# Patient Record
Sex: MALE | Race: WHITE | NOT HISPANIC OR LATINO | URBAN - METROPOLITAN AREA
[De-identification: names, ages, dates, MRNs, and addresses within clinical notes are randomized per-mention and may not be internally consistent; named-entity substitution may affect disease eponyms.]

---

## 2017-01-06 NOTE — PATIENT DISCUSSION
"""Annual Type 2 Diabetic eye exam with dilation. No diabetic retinopathy found. Recommend annual dilated examinations. Patient instructed to call office immediately if sudden changes in vision occur. Emphasized importance of good blood glucose control. Summary of care provided to the physician managing the ongoing diabetes care. """

## 2018-06-08 ENCOUNTER — IMPORTED ENCOUNTER (OUTPATIENT)
Dept: URBAN - METROPOLITAN AREA CLINIC 50 | Facility: CLINIC | Age: 73
End: 2018-06-08

## 2018-10-05 NOTE — PATIENT DISCUSSION
"""Annual Type 2 Diabetic eye exam with dilation. 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 Macular edema is not present in the left eye. Recommend annual dilated examinations. Patient instructed to call office immediately if sudden changes in vision occur. Emphasized importance of good blood glucose control. Summary of care provided to the physician managing the ongoing diabetes care. """

## 2019-06-13 ENCOUNTER — IMPORTED ENCOUNTER (OUTPATIENT)
Dept: URBAN - METROPOLITAN AREA CLINIC 50 | Facility: CLINIC | Age: 74
End: 2019-06-13

## 2020-01-03 NOTE — PATIENT DISCUSSION
"""Annual Type 2 Diabetic eye exam with dilation. 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 Macular edema is not present in the right eye. Recommend annual dilated examinations. Patient instructed to call office immediately if sudden changes in vision occur. Emphasized importance of good blood glucose control. Summary of care provided to the physician managing the ongoing diabetes care. """ No

## 2020-09-28 ENCOUNTER — IMPORTED ENCOUNTER (OUTPATIENT)
Dept: URBAN - METROPOLITAN AREA CLINIC 50 | Facility: CLINIC | Age: 75
End: 2020-09-28

## 2021-04-17 ASSESSMENT — VISUAL ACUITY
OD_CC: J2@ 22 IN
OD_OTHER: 20/30. 20/40.
OD_CC: J1+@ 13 IN
OD_BAT: 20/30
OS_BAT: 20/70
OD_CC: 20/40
OD_CC: J1+@ 17 IN
OD_CC: 20/40+
OD_PH: 20/40
OD_OTHER: 20/80. >20/400.
OD_BAT: 20/80
OD_BAT: 20/80-
OD_PH: 20/25
OS_CC: 20/40+2
OD_CC: 20/60-1
OS_OTHER: 20/100. >20/400.
OS_CC: 20/25
OD_OTHER: 20/80-. 20/100.
OS_PH: 20/30
OS_CC: J1+@ 17 IN
OS_CC: 20/20-1
OS_CC: J2@ 22 IN
OS_OTHER: 20/70. 20/80.
OS_OTHER: 20/30. 20/40.
OS_BAT: 20/100
OS_CC: J1+@ 13 IN
OS_BAT: 20/30

## 2021-04-17 ASSESSMENT — TONOMETRY
OS_IOP_MMHG: 19
OD_IOP_MMHG: 18
OS_IOP_MMHG: 12
OD_IOP_MMHG: 15
OD_IOP_MMHG: 12
OS_IOP_MMHG: 15

## 2023-04-06 ENCOUNTER — POST-OP (OUTPATIENT)
Dept: URBAN - METROPOLITAN AREA CLINIC 49 | Facility: CLINIC | Age: 78
End: 2023-04-06

## 2023-04-06 ENCOUNTER — SURGERY/PROCEDURE (OUTPATIENT)
Dept: URBAN - METROPOLITAN AREA SURGERY 16 | Facility: SURGERY | Age: 78
End: 2023-04-06

## 2023-04-06 DIAGNOSIS — H25.11: ICD-10-CM

## 2023-04-06 DIAGNOSIS — Z98.41: ICD-10-CM

## 2023-04-06 DIAGNOSIS — Z96.1: ICD-10-CM

## 2023-04-06 PROCEDURE — 66984 XCAPSL CTRC RMVL W/O ECP: CPT

## 2023-04-06 ASSESSMENT — TONOMETRY: OD_IOP_MMHG: 05

## 2023-04-06 ASSESSMENT — VISUAL ACUITY: OD_SC: 20/400
